# Patient Record
Sex: FEMALE | Race: BLACK OR AFRICAN AMERICAN | Employment: FULL TIME | URBAN - METROPOLITAN AREA
[De-identification: names, ages, dates, MRNs, and addresses within clinical notes are randomized per-mention and may not be internally consistent; named-entity substitution may affect disease eponyms.]

---

## 2020-07-20 ENCOUNTER — AMBULATORY - HEALTHEAST (OUTPATIENT)
Dept: LAB | Facility: CLINIC | Age: 65
End: 2020-07-20

## 2020-07-20 ENCOUNTER — AMBULATORY - HEALTHEAST (OUTPATIENT)
Dept: FAMILY MEDICINE | Facility: CLINIC | Age: 65
End: 2020-07-20

## 2020-07-20 DIAGNOSIS — E03.9 HYPOTHYROIDISM, UNSPECIFIED TYPE: ICD-10-CM

## 2020-07-20 DIAGNOSIS — L74.9 SWEATING ABNORMALITY: ICD-10-CM

## 2020-07-20 LAB
ALBUMIN SERPL-MCNC: 4.4 G/DL (ref 3.5–5)
ALP SERPL-CCNC: 58 U/L (ref 45–120)
ALT SERPL W P-5'-P-CCNC: 20 U/L (ref 0–45)
ANION GAP SERPL CALCULATED.3IONS-SCNC: 13 MMOL/L (ref 5–18)
AST SERPL W P-5'-P-CCNC: 19 U/L (ref 0–40)
BASOPHILS # BLD AUTO: 0 THOU/UL (ref 0–0.2)
BASOPHILS NFR BLD AUTO: 1 % (ref 0–2)
BILIRUB SERPL-MCNC: 0.7 MG/DL (ref 0–1)
BUN SERPL-MCNC: 13 MG/DL (ref 8–22)
CALCIUM SERPL-MCNC: 9.8 MG/DL (ref 8.5–10.5)
CHLORIDE BLD-SCNC: 103 MMOL/L (ref 98–107)
CO2 SERPL-SCNC: 25 MMOL/L (ref 22–31)
CREAT SERPL-MCNC: 0.85 MG/DL (ref 0.6–1.1)
CRP SERPL HS-MCNC: 1 MG/L (ref 0–3)
EOSINOPHIL # BLD AUTO: 0.1 THOU/UL (ref 0–0.4)
EOSINOPHIL NFR BLD AUTO: 2 % (ref 0–6)
ERYTHROCYTE [DISTWIDTH] IN BLOOD BY AUTOMATED COUNT: 13.7 % (ref 11–14.5)
GFR SERPL CREATININE-BSD FRML MDRD: >60 ML/MIN/1.73M2
GLUCOSE BLD-MCNC: 83 MG/DL (ref 70–125)
HCT VFR BLD AUTO: 39.8 % (ref 35–47)
HGB BLD-MCNC: 13.2 G/DL (ref 12–16)
LYMPHOCYTES # BLD AUTO: 1.8 THOU/UL (ref 0.8–4.4)
LYMPHOCYTES NFR BLD AUTO: 39 % (ref 20–40)
MCH RBC QN AUTO: 31.9 PG (ref 27–34)
MCHC RBC AUTO-ENTMCNC: 33.2 G/DL (ref 32–36)
MCV RBC AUTO: 96 FL (ref 80–100)
MONOCYTES # BLD AUTO: 0.5 THOU/UL (ref 0–0.9)
MONOCYTES NFR BLD AUTO: 11 % (ref 2–10)
NEUTROPHILS # BLD AUTO: 2.2 THOU/UL (ref 2–7.7)
NEUTROPHILS NFR BLD AUTO: 47 % (ref 50–70)
PLATELET # BLD AUTO: 231 THOU/UL (ref 140–440)
PMV BLD AUTO: 11.4 FL (ref 8.5–12.5)
POTASSIUM BLD-SCNC: 3.9 MMOL/L (ref 3.5–5)
PROT SERPL-MCNC: 7.7 G/DL (ref 6–8)
RBC # BLD AUTO: 4.14 MILL/UL (ref 3.8–5.4)
SODIUM SERPL-SCNC: 141 MMOL/L (ref 136–145)
T3 SERPL-MCNC: 89 NG/DL (ref 45–175)
T4 FREE SERPL-MCNC: 0.8 NG/DL (ref 0.7–1.8)
TSH SERPL DL<=0.005 MIU/L-ACNC: 5.82 UIU/ML (ref 0.3–5)
VIT B12 SERPL-MCNC: 694 PG/ML (ref 213–816)
WBC: 4.7 THOU/UL (ref 4–11)

## 2020-07-21 ENCOUNTER — COMMUNICATION - HEALTHEAST (OUTPATIENT)
Dept: FAMILY MEDICINE | Facility: CLINIC | Age: 65
End: 2020-07-21

## 2020-07-21 ENCOUNTER — AMBULATORY - HEALTHEAST (OUTPATIENT)
Dept: FAMILY MEDICINE | Facility: CLINIC | Age: 65
End: 2020-07-21

## 2020-07-21 DIAGNOSIS — E55.9 VITAMIN D DEFICIENCY: ICD-10-CM

## 2020-07-21 DIAGNOSIS — E03.9 HYPOTHYROIDISM, UNSPECIFIED TYPE: ICD-10-CM

## 2020-07-21 LAB — 25(OH)D3 SERPL-MCNC: 13.3 NG/ML (ref 30–80)

## 2020-07-24 ENCOUNTER — COMMUNICATION - HEALTHEAST (OUTPATIENT)
Dept: FAMILY MEDICINE | Facility: CLINIC | Age: 65
End: 2020-07-24

## 2020-07-27 ENCOUNTER — COMMUNICATION - HEALTHEAST (OUTPATIENT)
Dept: FAMILY MEDICINE | Facility: CLINIC | Age: 65
End: 2020-07-27

## 2020-07-29 ENCOUNTER — COMMUNICATION - HEALTHEAST (OUTPATIENT)
Dept: FAMILY MEDICINE | Facility: CLINIC | Age: 65
End: 2020-07-29

## 2020-07-30 ENCOUNTER — AMBULATORY - HEALTHEAST (OUTPATIENT)
Dept: FAMILY MEDICINE | Facility: CLINIC | Age: 65
End: 2020-07-30

## 2020-07-30 DIAGNOSIS — E03.9 HYPOTHYROIDISM, UNSPECIFIED TYPE: ICD-10-CM

## 2020-07-30 DIAGNOSIS — E55.9 VITAMIN D DEFICIENCY: ICD-10-CM

## 2020-07-30 RX ORDER — LEVOTHYROXINE SODIUM 50 UG/1
50 TABLET ORAL DAILY
Qty: 90 TABLET | Refills: 0 | Status: SHIPPED | OUTPATIENT
Start: 2020-07-30 | End: 2022-02-22

## 2020-07-30 RX ORDER — ERGOCALCIFEROL 1.25 MG/1
CAPSULE ORAL
Qty: 24 CAPSULE | Refills: 1 | Status: SHIPPED | OUTPATIENT
Start: 2020-07-30 | End: 2022-02-22 | Stop reason: ALTCHOICE

## 2021-06-10 NOTE — TELEPHONE ENCOUNTER
Medication Question or Clarification  Who is calling: patient   What medication are you calling about (include dose and sig)?:     ergocalciferol (ERGOCALCIFEROL) 1,250 mcg (50,000 unit) capsule  24 capsule  1  2020      Si every twice weekly with a fatty meal       And     levothyroxine (SYNTHROID, LEVOTHROID) 50 MCG tablet  90 tablet  0  2020      Sig - Route: Take 1 tablet (50 mcg total) by mouth daily. With Goal TSH 1.0 - Oral       Who prescribed the medication?: Dr. Russell MD  What is your question/concern?: Patient reports the medications are not @ the pharmacy. Please advise.  Requested Pharmacy: [facility]  Okay to leave a detailed message?: Yes

## 2021-06-10 NOTE — TELEPHONE ENCOUNTER
----- Message from Bal Wells MD sent at 7/21/2020  7:53 AM CDT -----  Call Leslee and set up a phone / video visit   Go over jonathan Ashford

## 2021-06-10 NOTE — TELEPHONE ENCOUNTER
"Looks like rx's were set up as \"phone in\".    Called [facility] in [place] and left a message with the verbal call in for these prescriptions.    I called pt and advised her of this.  She will check with them in the morning.  "

## 2021-06-20 NOTE — LETTER
Letter by Bal Wells MD at      Author: Bal Wells MD Service: -- Author Type: --    Filed:  Encounter Date: 7/21/2020 Status: (Other)         Leslee Ludwig  [address] [facility] [address]             July 21, 2020         Dear Ms. Ludwig,    Below are the results from your recent visit:    Resulted Orders   Comprehensive Metabolic Panel   Result Value Ref Range    Sodium 141 136 - 145 mmol/L    Potassium 3.9 3.5 - 5.0 mmol/L    Chloride 103 98 - 107 mmol/L    CO2 25 22 - 31 mmol/L    Anion Gap, Calculation 13 5 - 18 mmol/L    Glucose 83 70 - 125 mg/dL    BUN 13 8 - 22 mg/dL    Creatinine 0.85 0.60 - 1.10 mg/dL    GFR MDRD Af Amer >60 >60 mL/min/1.73m2    GFR MDRD Non Af Amer >60 >60 mL/min/1.73m2    Bilirubin, Total 0.7 0.0 - 1.0 mg/dL    Calcium 9.8 8.5 - 10.5 mg/dL    Protein, Total 7.7 6.0 - 8.0 g/dL    Albumin 4.4 3.5 - 5.0 g/dL    Alkaline Phosphatase 58 45 - 120 U/L    AST 19 0 - 40 U/L    ALT 20 0 - 45 U/L    Narrative    Fasting Glucose reference range is 70-99 mg/dL per  American Diabetes Association (ADA) guidelines.   T3, Total   Result Value Ref Range    T3, Total 89 45 - 175 ng/dL   T4, Free   Result Value Ref Range    Free T4 0.8 0.7 - 1.8 ng/dL   Thyroid Stimulating Hormone (TSH)   Result Value Ref Range    TSH 5.82 (H) 0.30 - 5.00 uIU/mL   Vitamin D, Total (25-Hydroxy)   Result Value Ref Range    Vitamin D, Total (25-Hydroxy) 13.3 (L) 30.0 - 80.0 ng/mL    Narrative    Deficiency <10.0 ng/mL  Insufficiency 10.0-29.9 ng/mL  Sufficiency 30.0-80.0 ng/mL  Toxicity (possible) >100.0 ng/mL   C -Reactive Protein, High Sensitivity   Result Value Ref Range    CRP, High Sensitivity 1.0 0.0 - 3.0 mg/L    Narrative    low risk < 1.0 mg/l  average risk 1.0 to 3.0 mg/l  hi risk > 3.0 mg/l  acute inflamation > 10.0 mg/l         Vitamin B12   Result Value Ref Range    Vitamin B-12 694 213 - 816 pg/mL   HM1 (CBC with Diff)   Result Value Ref Range    WBC 4.7 4.0 - 11.0 thou/uL    RBC 4.14 3.80  - 5.40 mill/uL    Hemoglobin 13.2 12.0 - 16.0 g/dL    Hematocrit 39.8 35.0 - 47.0 %    MCV 96 80 - 100 fL    MCH 31.9 27.0 - 34.0 pg    MCHC 33.2 32.0 - 36.0 g/dL    RDW 13.7 11.0 - 14.5 %    Platelets 231 140 - 440 thou/uL    MPV 11.4 8.5 - 12.5 fL    Neutrophils % 47 (L) 50 - 70 %    Lymphocytes % 39 20 - 40 %    Monocytes % 11 (H) 2 - 10 %    Eosinophils % 2 0 - 6 %    Basophils % 1 0 - 2 %    Neutrophils Absolute 2.2 2.0 - 7.7 thou/uL    Lymphocytes Absolute 1.8 0.8 - 4.4 thou/uL    Monocytes Absolute 0.5 0.0 - 0.9 thou/uL    Eosinophils Absolute 0.1 0.0 - 0.4 thou/uL    Basophils Absolute 0.0 0.0 - 0.2 thou/uL     Pan Camilo     Please call with questions or contact us using [employer].    Sincerely,        Electronically signed by Bal Wells MD

## 2021-06-20 NOTE — LETTER
Letter by Bal Wells MD at      Author: Bal Wells MD Service: -- Author Type: --    Filed:  Encounter Date: 7/21/2020 Status: (Other)         Leslee Ludwig  [address] [facility] [address]             July 21, 2020         Dear Ms. Ludwig,    Below are the results from your recent visit:    Resulted Orders   Comprehensive Metabolic Panel   Result Value Ref Range    Sodium 141 136 - 145 mmol/L    Potassium 3.9 3.5 - 5.0 mmol/L    Chloride 103 98 - 107 mmol/L    CO2 25 22 - 31 mmol/L    Anion Gap, Calculation 13 5 - 18 mmol/L    Glucose 83 70 - 125 mg/dL    BUN 13 8 - 22 mg/dL    Creatinine 0.85 0.60 - 1.10 mg/dL    GFR MDRD Af Amer >60 >60 mL/min/1.73m2    GFR MDRD Non Af Amer >60 >60 mL/min/1.73m2    Bilirubin, Total 0.7 0.0 - 1.0 mg/dL    Calcium 9.8 8.5 - 10.5 mg/dL    Protein, Total 7.7 6.0 - 8.0 g/dL    Albumin 4.4 3.5 - 5.0 g/dL    Alkaline Phosphatase 58 45 - 120 U/L    AST 19 0 - 40 U/L    ALT 20 0 - 45 U/L    Narrative    Fasting Glucose reference range is 70-99 mg/dL per  American Diabetes Association (ADA) guidelines.   T3, Total   Result Value Ref Range    T3, Total 89 45 - 175 ng/dL   T4, Free   Result Value Ref Range    Free T4 0.8 0.7 - 1.8 ng/dL   Thyroid Stimulating Hormone (TSH)   Result Value Ref Range    TSH 5.82 (H) 0.30 - 5.00 uIU/mL   Vitamin D, Total (25-Hydroxy)   Result Value Ref Range    Vitamin D, Total (25-Hydroxy) 13.3 (L) 30.0 - 80.0 ng/mL    Narrative    Deficiency <10.0 ng/mL  Insufficiency 10.0-29.9 ng/mL  Sufficiency 30.0-80.0 ng/mL  Toxicity (possible) >100.0 ng/mL   C -Reactive Protein, High Sensitivity   Result Value Ref Range    CRP, High Sensitivity 1.0 0.0 - 3.0 mg/L    Narrative    low risk < 1.0 mg/l  average risk 1.0 to 3.0 mg/l  hi risk > 3.0 mg/l  acute inflamation > 10.0 mg/l         Vitamin B12   Result Value Ref Range    Vitamin B-12 694 213 - 816 pg/mL   HM1 (CBC with Diff)   Result Value Ref Range    WBC 4.7 4.0 - 11.0 thou/uL    RBC 4.14 3.80  - 5.40 mill/uL    Hemoglobin 13.2 12.0 - 16.0 g/dL    Hematocrit 39.8 35.0 - 47.0 %    MCV 96 80 - 100 fL    MCH 31.9 27.0 - 34.0 pg    MCHC 33.2 32.0 - 36.0 g/dL    RDW 13.7 11.0 - 14.5 %    Platelets 231 140 - 440 thou/uL    MPV 11.4 8.5 - 12.5 fL    Neutrophils % 47 (L) 50 - 70 %    Lymphocytes % 39 20 - 40 %    Monocytes % 11 (H) 2 - 10 %    Eosinophils % 2 0 - 6 %    Basophils % 1 0 - 2 %    Neutrophils Absolute 2.2 2.0 - 7.7 thou/uL    Lymphocytes Absolute 1.8 0.8 - 4.4 thou/uL    Monocytes Absolute 0.5 0.0 - 0.9 thou/uL    Eosinophils Absolute 0.1 0.0 - 0.4 thou/uL    Basophils Absolute 0.0 0.0 - 0.2 thou/uL       Hi Leslee ( I know you do not go by Leslee)  Thyroid is LOW  TSH is High      This is the Brain sensing the Thyroid Levels are low.    When thyroid levels are low, the brain produces more TSH to tell the THyroid to make more hormone.      Your level suggests that you are HYPOthyroid or your body is LOW in thyroid hormone.    This means either you need a supplement or an adjustment in the thyroid medication supplement to bring this level into balance.      When the TSH is high, it suggests that you may need to start a supplement like Levothyroxine or make an adjustment in your medication.     Some supplements and nutrients support thyroid function.  They are:    Selenium 200 mcg daily  Zinc 30 mg Daily/ Copper 1 mg   Vitamin D3 / K2  5000 IU /  mcg Daily  Iodine 150 mcg Daily  Vitamin A 5000 IU Daily  Iron 18 mg Daily  Nigella Sativa (Black Cumin) 1 gram Twice Daily    Vitamin D is Low  Your vitamin D level is low.    Our goal for vitamin D is between 50 and 60 ng/ml  Vitamin D is a fat soluble vitamin.  It should be taken with fatty foods for better absorption.  Start by taking 2000 international units daily with fatty food and recheck your level after 3-6 months to gauge where it is at.  Sunshine is another source of vitamin D.  Some foods are fortified with vitamin D.  Eggs and certain lean  meats are also good sources of vitamin D.    Vitamin D is important for bone health as well as may function as an immune modulator.    Let's talk about these   I will Rx Medication to [facility]       Makeda  Please call with questions or contact us using [facility] [employer].    Sincerely,        Electronically signed by Bal Wells MD

## 2022-01-25 ENCOUNTER — LAB (OUTPATIENT)
Dept: LAB | Facility: CLINIC | Age: 67
End: 2022-01-25
Payer: COMMERCIAL

## 2022-01-25 DIAGNOSIS — E55.9 VITAMIN D DEFICIENCY: ICD-10-CM

## 2022-01-25 DIAGNOSIS — E03.9 HYPOTHYROIDISM, UNSPECIFIED TYPE: ICD-10-CM

## 2022-01-25 DIAGNOSIS — E03.9 HYPOTHYROIDISM, UNSPECIFIED TYPE: Primary | ICD-10-CM

## 2022-01-25 LAB — TSH SERPL DL<=0.005 MIU/L-ACNC: 4.67 UIU/ML (ref 0.3–5)

## 2022-01-25 PROCEDURE — 36415 COLL VENOUS BLD VENIPUNCTURE: CPT

## 2022-01-25 PROCEDURE — 82306 VITAMIN D 25 HYDROXY: CPT

## 2022-01-25 PROCEDURE — 84443 ASSAY THYROID STIM HORMONE: CPT

## 2022-01-26 LAB — DEPRECATED CALCIDIOL+CALCIFEROL SERPL-MC: 44 UG/L (ref 30–80)

## 2022-02-22 ENCOUNTER — ANCILLARY PROCEDURE (OUTPATIENT)
Dept: MAMMOGRAPHY | Facility: CLINIC | Age: 67
End: 2022-02-22
Attending: FAMILY MEDICINE
Payer: COMMERCIAL

## 2022-02-22 ENCOUNTER — OFFICE VISIT (OUTPATIENT)
Dept: FAMILY MEDICINE | Facility: CLINIC | Age: 67
End: 2022-02-22
Payer: COMMERCIAL

## 2022-02-22 ENCOUNTER — ANCILLARY PROCEDURE (OUTPATIENT)
Dept: BONE DENSITY | Facility: CLINIC | Age: 67
End: 2022-02-22
Attending: FAMILY MEDICINE
Payer: COMMERCIAL

## 2022-02-22 VITALS
DIASTOLIC BLOOD PRESSURE: 70 MMHG | HEIGHT: 67 IN | RESPIRATION RATE: 20 BRPM | OXYGEN SATURATION: 99 % | SYSTOLIC BLOOD PRESSURE: 128 MMHG | BODY MASS INDEX: 25.93 KG/M2 | HEART RATE: 76 BPM | WEIGHT: 165.2 LBS

## 2022-02-22 DIAGNOSIS — Z71.6 ENCOUNTER FOR TOBACCO USE CESSATION COUNSELING: ICD-10-CM

## 2022-02-22 DIAGNOSIS — Z12.31 VISIT FOR SCREENING MAMMOGRAM: ICD-10-CM

## 2022-02-22 DIAGNOSIS — Z78.0 MENOPAUSE: Primary | ICD-10-CM

## 2022-02-22 DIAGNOSIS — Z78.0 MENOPAUSE: ICD-10-CM

## 2022-02-22 DIAGNOSIS — H26.9 CATARACT OF BOTH EYES, UNSPECIFIED CATARACT TYPE: ICD-10-CM

## 2022-02-22 DIAGNOSIS — E03.9 HYPOTHYROIDISM, UNSPECIFIED TYPE: ICD-10-CM

## 2022-02-22 DIAGNOSIS — Z12.2 SCREENING FOR LUNG CANCER: ICD-10-CM

## 2022-02-22 DIAGNOSIS — Z11.59 NEED FOR HEPATITIS C SCREENING TEST: ICD-10-CM

## 2022-02-22 DIAGNOSIS — Z13.220 SCREENING FOR HYPERLIPIDEMIA: ICD-10-CM

## 2022-02-22 DIAGNOSIS — K02.9 DENTAL CARIES: ICD-10-CM

## 2022-02-22 DIAGNOSIS — Z12.11 SCREEN FOR COLON CANCER: ICD-10-CM

## 2022-02-22 DIAGNOSIS — Z78.0 POST-MENOPAUSAL: ICD-10-CM

## 2022-02-22 DIAGNOSIS — E04.1 THYROID NODULE: ICD-10-CM

## 2022-02-22 LAB
CHOLEST SERPL-MCNC: 235 MG/DL
FASTING STATUS PATIENT QL REPORTED: YES
HDLC SERPL-MCNC: 63 MG/DL
LDLC SERPL CALC-MCNC: 151 MG/DL
T4 FREE SERPL-MCNC: 0.7 NG/DL (ref 0.7–1.8)
TRIGL SERPL-MCNC: 107 MG/DL
TSH SERPL DL<=0.005 MIU/L-ACNC: 5.01 UIU/ML (ref 0.3–5)

## 2022-02-22 PROCEDURE — 84443 ASSAY THYROID STIM HORMONE: CPT | Performed by: FAMILY MEDICINE

## 2022-02-22 PROCEDURE — 77080 DXA BONE DENSITY AXIAL: CPT | Mod: 59 | Performed by: INTERNAL MEDICINE

## 2022-02-22 PROCEDURE — 90471 IMMUNIZATION ADMIN: CPT | Performed by: FAMILY MEDICINE

## 2022-02-22 PROCEDURE — 84439 ASSAY OF FREE THYROXINE: CPT | Performed by: FAMILY MEDICINE

## 2022-02-22 PROCEDURE — 90715 TDAP VACCINE 7 YRS/> IM: CPT | Performed by: FAMILY MEDICINE

## 2022-02-22 PROCEDURE — 77067 SCR MAMMO BI INCL CAD: CPT | Mod: TC | Performed by: RADIOLOGY

## 2022-02-22 PROCEDURE — 36415 COLL VENOUS BLD VENIPUNCTURE: CPT | Performed by: FAMILY MEDICINE

## 2022-02-22 PROCEDURE — 80061 LIPID PANEL: CPT | Performed by: FAMILY MEDICINE

## 2022-02-22 PROCEDURE — 99203 OFFICE O/P NEW LOW 30 MIN: CPT | Mod: 25 | Performed by: FAMILY MEDICINE

## 2022-02-22 PROCEDURE — 77081 DXA BONE DENSITY APPENDICULR: CPT | Performed by: INTERNAL MEDICINE

## 2022-02-22 RX ORDER — LEVOTHYROXINE SODIUM 50 UG/1
50 TABLET ORAL DAILY
Qty: 90 TABLET | Refills: 0 | Status: SHIPPED | OUTPATIENT
Start: 2022-02-22 | End: 2022-05-27

## 2022-02-22 NOTE — PATIENT INSTRUCTIONS
So great to see you today Leslee!    See lewis Sarkar!!    I have ordered the following exams    I have ordered lung cancer screening    I have ordered a thyroid ultrasound to evaluate the right thyroid nodule I would like you to follow-up with Dr. Mason [facility]    I have ordered nicotine inhaler to be used in place of tobacco    I would like you to restart levothyroxine 50 mcg 1 daily  Recheck your labs in 8 weeks there is a standing order for this    Please accomplish your colonoscopy    Please accomplish your mammogram    If you would like to have a Pap smear schedule this at a future date although screening can stop at age 65    Please have a dentist see you to get your broken teeth removed    Reasons to have cataracts fixed are trouble with night vision or difficulty reading    Consider getting a pneumonia shot and shingles shot at an outside pharmacy    Makeda    Adult Physical AVS      Thank you for scheduling and completing a Physical Check up!      There has been suggestions that this is an Unnecessary part of the current care for patients by some. I do not agree!    It is a reminder to take stock in an overall health plan.     It also hopefully will engage dialogue about wellness and focusing on measure to stay well and fit, hopefully avoiding extra trips to see us!      -----------Wellness Pillars-----------    1. Nutrient Dense Nutrition-- we are or will become and are activated by what we eat! It is paramount that we all focus on eating well. This means trying to eat ?hole Foods,  single ingredient foods that nourish and activate our bodies. Food that are high in nutrients help run our bodies in a way that palafox and can transform our health and help us to heal and repair. Major groups include. Fats----preferably plant based. These help ours brains and nervous system. Proteins---from plants and animals. Nuts, Beans and Animal proteins. These help our musculoskeletal system. Complex  Carbohydrates---fresh fruits vegetables, and whole grains. In order to maintain balance, we must give our bodies balanced nutrition. There are things we should avoid. Most processed foods and all added sugar should be avoided. If you do not prepare your own food, order simple foods a la carte. Order a protein and pair it with a side of vegetables. Vegetable should occupy more than half of your plate. Try to avoid simple carbohydrates like chips or fries.   2. Restorative Sleep----we all need sleep to allow our bodies to heal and repair. Sleep is indispensable and necessary. Good quality sleep is different from the simple quantitative amount of sleep. It is possible to sleep without getting any rest or restorative sleep. This is why we ask about refreshing sleep, because it is possible to sleep and not have restorative sleep. If your sleep is not refreshing, you may requires a visit with a Sleep Specialist to help sort this out. For quantity, aim for 8-10 hours daily.   3. Movement---exercise has numerous health benefits. Bottom line, in order to do the things we do in life, it is necessary to condition, nurture and repair our machine. Food provides the tools and the basic repair structure and vital nutrients. Rest allows time and focus for repair and restoration. Exercise conditions and activated reparative mechanisms. I would suggest thinking about one's high school weight as a rule and aim for a weight plus 15 pounds for men and 10 pounds for women as a goal. Also we should strive to engage in intentional activity 3 to 4 days of cardio fitness 30-60 minutes and 1-3 days of strength training. We want to be fit as we age and have stamina to do activities of daily living and beyond. Walk, bike, swim, run, box, dance, and so on, find your thing! The benefits are incredible! Exercise lowers blood pressure, helps treat and prevent diabetes and helps us live longer and in a way that is more satisfying. As [employer] says,   Just Do It!  4. Mindfulness----get in touch with your mind body connection. Take time daily to reflect on and be cognizant of how you are doing----eating, working, parenting, interacting with loved ones, friends and others. Be intentional and present in relating to things in which you are engaged.     Preventative Care    Colon Cancer screening. It may not be glamorous, but it saves lives and may save yours. There is colonoscopy and immuno-chemical testing. Pick your mode , but get it done. If there is a first degree relative that has had Colon Cancer, we may recommend screening 10 years before the age of that index case.     Breast Cancer Screening. This is mostly for women. Get to know the architecture of your breasts. If it makes you feel more comfortable, engage your partner as well. If something strikes you as ?ot right,  get it checked out. Mammogram breast cancer screening does detect cancers. Self breast exams can detect cancers. Guidelines vary but in general start screen in adulthood for self exams and mammography in your 40s. If there is a first degree relative or many with cancers, this may be earlier or involve genetic screening.     Pap Smears Cervical Cancer Screening---again women. Start screening after sexual activity or intercourse begins. Cervical cancer really is tied to exposure to HPV virus and this is related to sexual intercourse. Cervical cancer is treatable and preventable, as Pap smears should detect changes BEFORE cancerous transformation.   All women that have a cervix should be screen between 21-65 as a rule. Intervals vary based on age and medical history.     Vaccines. Yes there is controversy. But I still think vaccines save lives and reduce disease burden in our human populations. Please consider getting vaccinated as you are due for Vaccines.     About Vitamins and Supplements     I do recommend that adult and kids consider a multivitamin as way to enrich our nutrition.     A  solid multivitamin. Ask one of us for recommendations. Our pharmacy carry some high quality lower cost vitamins that we have recommends.     Sarita 3/6 Oils Fish Oils, Flax Oils, Krill Oils, Algea Oils, and Cumin Seed and Borage Oils are among the Omega 3s and 6s. Good oils nourish our nervous system and engage our immune system in positive ways.     Vitamin D. If you are in [place]. You probably need some. We shoot for a level of 50-60. So sometime this takes staring lower and titrating up a supplement. Start at 4992-5035 IU wit a fatty meal and check levels.             Certain supplements may help with our gut's immune system or Microbiome.   Start with plants, many and of diverse colors.   Consider cultured foods with live active bacteria. Examples are Yogurt, Kefir and Kombucha.     Eat Prebiotic foods from the Chicori family, asparagus, bananas, inulin fibers and more.     Other supplements that may help are Zinc Carnosine, D-limonene, Vitamin D and Colostrum.     It may prudent to try the Elimination Diet and eliminate certain foods such as Wheat products, Dairy Products and Especially Refined Sugars.       We are screening multiple issues:     High Blood Pressure.   We ideally have readings less than 130 on the top number and less than 80 on the bottom.     Diabetes.   Diabetes is the body not processing sugars in an efficient way. When sugars are not dealt with in an efficient manner, every part of the body can be effected, the heart such as a heart attack or failure, the brain such as a stroke and really any part of the body. Insulin levels being overworked really drives diabetes. Lowering intake of simple sugar and exercising are two major ways to treat and stave off Diabetes.     Heart Disease:  Heart issues usually arise out of a combination of genetic issues and life stressors and choices.   Focusing on the Pillars of Wellness are ways to help prevent heart disease. Avoiding tobacco, limiting alcohol  intake to moderate intake and addressing out  of balance cholesterol, presence of diabetes and persistently elevated blood pressure may require medications in addition to life style changes.     Skin cancer is typically due to cumulative Sun Damage. There are other genetic factor as well. If skin looks irritated or a mole changes color, shape, size or has irregular borders, get it checked out. Skin exams can also save lives.     If you are Diabetic or Have Known Heart Disease. We have goals for your best Care     A1C. For Diabetics     This is a measure of how well controlled your sugars are over the last 3 months. In general, we would like the percent number less than 7% for most diabetics. In the morning and before all meals the sugar number should be less than 150. If you are diabetic and this is the case, you are managing well. The Pillars of Wellness are still a guide to keeping your body in balance.     Blood Pressure for Diabetics and Heart Disease     Top < 130 Bottom < 80  This goal of blood pressure control reduces Diabetes complications, such as stroke or heart attack. Life style first and add medication if consistently high.     No Tobacco. For Anyone!    Smoking tobacco or chewing produces by products that are particularly harmful to Diabetics, but really all of us. I implore you to not use tobacco products.     Aspirin for Heart Disease Patient     If you have high blood pressure, stroke or heart disease risk, you probably would benefit from a baby aspirin. There reasons to avoid aspirin such as allergies, risks for bleeding, etc. have the discussion of aspirin should be part of your therapy.     Statin Medications for Heart Disease, Vascular Disease or High Risk Patients    These medicine have statistically been shown to benefit Diabetic patients, especially those with disordered cholesterol profiles. I like to do an NMR panel that shows Atherogenic Risk (Stroke/ Heart Attack) and Insulin Resistance.  Likely this may be recommended as part of you treatment plan.

## 2022-02-22 NOTE — PROGRESS NOTES
ASSESSMENT & PLAN    No problem-specific Assessment & Plan notes found for this encounter.      Leslee was seen today for medication follow-up.    Diagnoses and all orders for this visit:    Menopause  -     DEXA HIP/PELVIS/SPINE - Future; Future    Visit for screening mammogram  -     MA SCREENING DIGITAL BILAT - Future  (s+30); Future    Screen for colon cancer  -     Adult Gastro Ref - Procedure Only; Future    Need for hepatitis C screening test    Screening for hyperlipidemia  -     Lipid panel reflex to direct LDL Fasting; Future    Hypothyroidism, unspecified type  -     levothyroxine (SYNTHROID/LEVOTHROID) 50 MCG tablet; Take 1 tablet (50 mcg) by mouth daily  -     TSH with free T4 reflex; Standing    Dental caries  -     Dental Referral; Future    Thyroid nodule  -     US Thyroid; Future  -     Otolaryngology Referral; Future    Screening for lung cancer  -     CT Chest Lung Cancer Scrn Low Dose wo; Future    Encounter for tobacco use cessation counseling  -     nicotine (NICOTROL) 10 MG inhaler; Use 1 cartridge as needed for urge to smoke by puffing over course of 20min.  Use 6-16 cart/day; reduce number of cart/day over 6-12 weeks.    Cataract of both eyes, unspecified cataract type    Other orders  -     REVIEW OF HEALTH MAINTENANCE PROTOCOL ORDERS  -     TDAP VACCINE (Adacel, Boostrix)  [[id number]]        Patient Instructions   So great to see you today Leslee!    See hi Antonina!!    I have ordered the following exams    I have ordered lung cancer screening    I have ordered a thyroid ultrasound to evaluate the right thyroid nodule I would like you to follow-up with Dr. Mason Naranjo [facility]    I have ordered nicotine inhaler to be used in place of tobacco    I would like you to restart levothyroxine 50 mcg 1 daily  Recheck your labs in 8 weeks there is a standing order for this    Please accomplish your colonoscopy    Please accomplish your mammogram    If you would like to have a Pap smear schedule  this at a future date although screening can stop at age 65    Please have a dentist see you to get your broken teeth removed          Return in about 2 months (around 4/22/2022).       PATIENT HEALTH QUESTIONNAIRE-9 (PHQ - 9)    Over the last 2 weeks, how often have you been bothered by any of the following problems?    1. Little interest or pleasure in doing things -      2. Feeling down, depressed, or hopeless -      3. Trouble falling or staying asleep, or sleeping too much -     4. Feeling tired or having little energy -      5. Poor appetite or overeating -      6. Feeling bad about yourself - or that you are a failure or have let yourself or your family down -      7. Trouble concentrating on things, such as reading the newspaper or watching television -     8. Moving or speaking so slowly that other people could have noticed? Or the opposite - being so fidgety or restless that you have been moving around a lot more than usual     9. Thoughts that you would be better off dead or of hurting  yourself in some way     Total Score:       If you checked off any problems, how difficult have these problems made it for you to do your work, take care of things at home, or get along with other people?      Developed by DrsAdalberto Martinez, Krista Ibanez, Rakesh Haddad and colleagues, with an educational carla from [employer] No permission required to reproduce, translate, display or distribute. permission required to reproduce, translate, display or distribute.    CHIEF COMPLAINT: Leslee Ludwig had concerns including Medication Follow-up.    [place]: 1.............. SUBJECTIVE:  Leslee Ludwig is a 66 year old female had concerns including Medication Follow-up.    1. Menopause    2. Visit for screening mammogram    3. Screen for colon cancer    4. Need for hepatitis C screening test    5. Screening for hyperlipidemia    6. Hypothyroidism, unspecified type    7. Dental caries    8. Thyroid nodule    9.  "Screening for lung cancer    10. Encounter for tobacco use cessation counseling    11. Cataract of both eyes, unspecified cataract type          No Known Allergies                      SOCIAL: She  reports that she has been smoking cigarettes. She has been smoking about 0.10 packs per day. She has never used smokeless tobacco. She reports current alcohol use. She reports that she does not use drugs.    REVIEW OF SYSTEMS:   Family history not pertinent to chief complaint or presenting problem    Review of systems otherwise negative as requested from patient, except   Those positive ROS outlined and discussed in [place].      VITALS:  Vitals:    02/22/22 1058   BP: 128/70   BP Location: Left arm   Patient Position: Sitting   Cuff Size: Adult Large   Pulse: 76   Resp: 20   SpO2: 99%   Weight: 74.9 kg (165 lb 3.2 oz)   Height: 1.702 m (5' 7\")     Wt Readings from Last 3 Encounters:   02/22/22 74.9 kg (165 lb 3.2 oz)     Body mass index is 25.87 kg/m .    Physical Exam:  Large thyroid nodule right side  No carotid bruits  Broken off teeth numbering 5  TMs are clear  Cataracts  Abdomen soft flat nontender with positive bowel sounds  No axillary adenopathy  Lungs are clear  Cardiac no murmur  No lower extremity edema  Excellent distal pulses       I spent 40 minutes with this patient.  This includes pre-visit, intra-visit and post visit work an evaluation with regards to Leslee was seen today for medication follow-up.    Diagnoses and all orders for this visit:    Menopause  -     DEXA HIP/PELVIS/SPINE - Future; Future    Visit for screening mammogram  -     MA SCREENING DIGITAL BILAT - Future  (s+30); Future    Screen for colon cancer  -     Adult Gastro Ref - Procedure Only; Future    Need for hepatitis C screening test    Screening for hyperlipidemia  -     Lipid panel reflex to direct LDL Fasting; Future    Hypothyroidism, unspecified type  -     levothyroxine (SYNTHROID/LEVOTHROID) 50 MCG tablet; Take 1 tablet (50 mcg) by " mouth daily  -     TSH with free T4 reflex; Standing    Dental caries  -     Dental Referral; Future    Thyroid nodule  -     US Thyroid; Future  -     Otolaryngology Referral; Future    Screening for lung cancer  -     CT Chest Lung Cancer Scrn Low Dose wo; Future    Encounter for tobacco use cessation counseling  -     nicotine (NICOTROL) 10 MG inhaler; Use 1 cartridge as needed for urge to smoke by puffing over course of 20min.  Use 6-16 cart/day; reduce number of cart/day over 6-12 weeks.    Cataract of both eyes, unspecified cataract type    Other orders  -     REVIEW OF HEALTH MAINTENANCE PROTOCOL ORDERS  -     TDAP VACCINE (Adacel, Boostrix)  [[id number]]        Bal Wells MD  Family [place] [phone]    Answers for HPI/ROS submitted by the patient on 2/22/2022  Since last visit, patient describes the following symptoms:: None  How many servings of fruits and vegetables do you eat daily?: 0-1  On average, how many sweetened beverages do you drink each day (Examples: soda, juice, sweet tea, etc.  Do NOT count diet or artificially sweetened beverages)?: 3  How many minutes a day do you exercise enough to make your heart beat faster?: 30 to 60  How many days a week do you exercise enough to make your heart beat faster?: 7  How many days per week do you miss taking your medication?: 2  What makes it hard for you to take your medication every day?: other

## 2022-02-25 ENCOUNTER — CONSENT FORM (OUTPATIENT)
Dept: FAMILY MEDICINE | Facility: CLINIC | Age: 67
End: 2022-02-25
Payer: COMMERCIAL

## 2022-02-25 ENCOUNTER — TELEPHONE (OUTPATIENT)
Dept: FAMILY MEDICINE | Facility: CLINIC | Age: 67
End: 2022-02-25
Payer: COMMERCIAL

## 2022-02-28 ENCOUNTER — TELEPHONE (OUTPATIENT)
Dept: FAMILY MEDICINE | Facility: CLINIC | Age: 67
End: 2022-02-28
Payer: COMMERCIAL

## 2022-03-02 ENCOUNTER — IMAGING REPORT SCAN (OUTPATIENT)
Dept: FAMILY MEDICINE | Facility: CLINIC | Age: 67
End: 2022-03-02
Payer: COMMERCIAL

## 2022-03-04 DIAGNOSIS — Z11.59 ENCOUNTER FOR SCREENING FOR OTHER VIRAL DISEASES: Primary | ICD-10-CM

## 2022-03-15 RX ORDER — SODIUM CHLORIDE, SODIUM LACTATE, POTASSIUM CHLORIDE, CALCIUM CHLORIDE 600; 310; 30; 20 MG/100ML; MG/100ML; MG/100ML; MG/100ML
INJECTION, SOLUTION INTRAVENOUS CONTINUOUS
Status: CANCELLED | OUTPATIENT
Start: 2022-03-15

## 2022-03-15 RX ORDER — LIDOCAINE 40 MG/G
CREAM TOPICAL
Status: CANCELLED | OUTPATIENT
Start: 2022-03-15

## 2022-03-16 ENCOUNTER — HOSPITAL ENCOUNTER (OUTPATIENT)
Facility: AMBULATORY SURGERY CENTER | Age: 67
Discharge: HOME | End: 2022-03-16
Attending: SURGERY
Payer: COMMERCIAL

## 2022-03-19 ENCOUNTER — HOSPITAL ENCOUNTER (OUTPATIENT)
Dept: ULTRASOUND IMAGING | Facility: HOSPITAL | Age: 67
Discharge: HOME | End: 2022-03-19
Attending: FAMILY MEDICINE
Payer: COMMERCIAL

## 2022-03-19 ENCOUNTER — HOSPITAL ENCOUNTER (OUTPATIENT)
Dept: CT IMAGING | Facility: HOSPITAL | Age: 67
Discharge: HOME | End: 2022-03-19
Attending: FAMILY MEDICINE
Payer: COMMERCIAL

## 2022-03-19 DIAGNOSIS — E04.1 THYROID NODULE: ICD-10-CM

## 2022-03-19 DIAGNOSIS — Z12.2 SCREENING FOR LUNG CANCER: ICD-10-CM

## 2022-03-19 PROCEDURE — 71271 CT THORAX LUNG CANCER SCR C-: CPT

## 2022-03-19 PROCEDURE — 76536 US EXAM OF HEAD AND NECK: CPT

## 2022-03-28 ENCOUNTER — TELEPHONE (OUTPATIENT)
Dept: FAMILY MEDICINE | Facility: CLINIC | Age: 67
End: 2022-03-28

## 2022-03-28 NOTE — TELEPHONE ENCOUNTER
LMTCB   Please relay message below when call is returned   Rj Anderson CMA on 3/28/2022 at 1:31 PM

## 2022-03-28 NOTE — LETTER
April 6, 2022      Leslee Ludwig  [address] [place] [address]        Dear Ms.Ludwig,    We are writing to inform you of your test results.        Resulted Orders   US Thyroid    Narrative    EXAM: US THYROID  LOCATION: [facility]  DATE/TIME: 3/19/2022 9:33 AM    INDICATION:  Thyroid nodule  COMPARISON: CT of the chest for lung cancer screening 03/19/2022  TECHNIQUE: Thyroid ultrasound.     FINDINGS:  RIGHT lobe: Mildly enlarged measuring 6.4 x 2.2 x 3.2 cm cm. There is a dominant bilobed right thyroid nodule with coarse peripheral rim calcifications. The separate lobes of the contiguous nodule were measured as separate nodules below.  Isthmus: 6 mm.  LEFT lobe: 3.8 x 0.9 x 1.6 cm. Mildly heterogeneous echotexture.    NECK: No cervical lymphadenopathy.    NODULES:    Nodule 1: Larger component of the bilobed circumscribed nodule in the inferior right thyroid measures 3.2 x 2.9 x 3.1 cm.   Composition: Solid or almost completely solid, 2 points   Echogenicity: Hyperechoic or isoechoic, 1 point   Shape: Wider-than-tall, 0 points   Margin: Smooth, 0 points   Echogenic Foci: Macrocalcifications, 1 point   Point Total: 4-6 points. TI-RADS 4. If 1.5 cm or larger, recommend FNA; if 1 cm or larger, follow up US (annually for 5 years).      Nodule 2: 2.4 x 2.8 x 1.5 cm heterogeneous nodule right mid thyroid with coarse calcification.  Composition: Solid or almost completely solid, 2 points   Echogenicity: Hyperechoic or isoechoic, 1 point   Shape: Wider-than-tall, 0 points   Margin: Smooth, 0 points   Echogenic Foci: Macrocalcifications, 1 point   Point Total: 4-6 points. TI-RADS 4. If 1.5 cm or larger, recommend FNA; if 1 cm or larger, follow up US (annually for 5 years).        Impression    IMPRESSION:    1.  Large bilobed nodule in the mid/low right thyroid with coarse peripheral calcifications. This is a TI RADS 4 lesion and based on size, cytologic assessment with FNA is  suggested.      Nodules are characterized per ACR Thyroid Imaging, Reporting and Data System (TI-RADS): White Paper of the ACR TI-RADS Committee Zev Shirley. et al. Journal of the American College of Radiology 2017. Volume 14 (2017), Issue 5, 587-595.          You should see ENT for a biopsy as a next step Dr Mason Naranjo            If you have any questions or concerns, please call the clinic at the number listed above.       Sincerely,

## 2022-03-28 NOTE — TELEPHONE ENCOUNTER
----- Message from Bal Wells MD sent at 3/25/2022 12:01 PM CDT -----  Inform Leslee She should see ENT for a biopsy as a next step Dr Mason Ashford

## 2022-04-06 ENCOUNTER — TELEPHONE (OUTPATIENT)
Dept: FAMILY MEDICINE | Facility: CLINIC | Age: 67
End: 2022-04-06
Payer: COMMERCIAL

## 2022-04-06 NOTE — TELEPHONE ENCOUNTER
----- Message from María Elena Hinojosa CNP sent at 3/21/2022  6:13 PM CDT -----  Attempted to contact with results, left VM.  Please try patient again.  Based on size of the thyroid nodule it is recommended to biopsy.  It sounds like she may already be established with ENT, but if not we can place a referral for her.

## 2022-04-29 ENCOUNTER — TRANSFERRED RECORDS (OUTPATIENT)
Dept: HEALTH INFORMATION MANAGEMENT | Facility: CLINIC | Age: 67
End: 2022-04-29
Payer: COMMERCIAL

## 2022-05-24 DIAGNOSIS — E03.9 HYPOTHYROIDISM, UNSPECIFIED TYPE: ICD-10-CM

## 2022-05-26 NOTE — TELEPHONE ENCOUNTER
"Routing refill request to provider for review/approval because:  Labs out of range:      Last Written Prescription Date:  2/22/22  Last Fill Quantity: 90,  # refills: 0   Last office visit provider:  2/22/22     Requested Prescriptions   Pending Prescriptions Disp Refills     levothyroxine (SYNTHROID/LEVOTHROID) 50 MCG tablet 90 tablet 0     Sig: Take 1 tablet (50 mcg) by mouth daily       Thyroid Protocol Failed - 5/24/2022  5:54 PM        Failed - Normal TSH on file in past 12 months     Recent Labs   Lab Test 02/22/22  1201   TSH 5.01*              Passed - Patient is 12 years or older        Passed - Recent (12 mo) or future (30 days) visit within the authorizing provider's specialty     Patient has had an office visit with the authorizing provider or a provider within the authorizing providers department within the previous 12 mos or has a future within next 30 days. See \"Patient Info\" tab in inbasket, or \"Choose Columns\" in Meds & Orders section of the refill encounter.              Passed - Medication is active on med list        Passed - No active pregnancy on record     If patient is pregnant or has had a positive pregnancy test, please check TSH.          Passed - No positive pregnancy test in past 12 months     If patient is pregnant or has had a positive pregnancy test, please check TSH.               Mecca Ramirez RN 05/25/22 8:57 PM  "

## 2022-05-27 RX ORDER — LEVOTHYROXINE SODIUM 50 UG/1
50 TABLET ORAL DAILY
Qty: 90 TABLET | Refills: 0 | Status: SHIPPED | OUTPATIENT
Start: 2022-05-27

## 2022-06-17 ENCOUNTER — TELEPHONE (OUTPATIENT)
Dept: FAMILY MEDICINE | Facility: CLINIC | Age: 67
End: 2022-06-17

## 2022-06-17 NOTE — TELEPHONE ENCOUNTER
Follow up CT low dose screen    Needs a follow up visit     Jose    IMPRESSION:   1.  Negative for lung cancer screening purposes. No solid noncalcified or nonsolid nodules.   2.  Territories of peripheral groundglass attenuation are present in both lower lobes with small foci of subpleural consolidation. Findings are consistent with acute inflammatory process including COVID pneumonia.   3.  Focal territory of abnormal lucency along with bronchiectasis medial right lower lobe. This either represents an accessory segment arising from a cardiac bronchus with atresia of the segmental bronchus near its origin for a focus of intralobar   sequestration. If further characterization is desired, thin section CT of the chest with contrast is suggested to evaluate the arterial supply of this abnormal lung.   4.  Large partially calcified right thyroid nodule. Lesion assessed with same day ultrasound.       LungRADS CATEGORY: 1S: Negative.       RADIOLOGIST RECOMMENDATION:       1.  Continue annual screening with low-dose CT chest in 12 months.   2.  Consider CT chest with contrast in 6-8 weeks for reassessment of the airspace opacities in the lower lobes as well as characterization of the vascular supply to the medial right lower lobe.